# Patient Record
Sex: FEMALE | Race: BLACK OR AFRICAN AMERICAN | NOT HISPANIC OR LATINO | Employment: OTHER | URBAN - METROPOLITAN AREA
[De-identification: names, ages, dates, MRNs, and addresses within clinical notes are randomized per-mention and may not be internally consistent; named-entity substitution may affect disease eponyms.]

---

## 2019-04-03 ENCOUNTER — TELEPHONE (OUTPATIENT)
Dept: NEUROLOGY | Facility: CLINIC | Age: 84
End: 2019-04-03

## 2019-04-30 ENCOUNTER — OFFICE VISIT (OUTPATIENT)
Dept: NEUROLOGY | Facility: CLINIC | Age: 84
End: 2019-04-30
Payer: MEDICARE

## 2019-04-30 VITALS
WEIGHT: 95.88 LBS | DIASTOLIC BLOOD PRESSURE: 64 MMHG | HEIGHT: 62 IN | HEART RATE: 117 BPM | SYSTOLIC BLOOD PRESSURE: 83 MMHG | BODY MASS INDEX: 17.64 KG/M2

## 2019-04-30 DIAGNOSIS — I47.10 SVT (SUPRAVENTRICULAR TACHYCARDIA): ICD-10-CM

## 2019-04-30 DIAGNOSIS — R63.4 WEIGHT LOSS, UNINTENTIONAL: ICD-10-CM

## 2019-04-30 DIAGNOSIS — E78.5 HYPERLIPIDEMIA, UNSPECIFIED HYPERLIPIDEMIA TYPE: ICD-10-CM

## 2019-04-30 DIAGNOSIS — F01.50 VASCULAR DEMENTIA WITHOUT BEHAVIORAL DISTURBANCE: Primary | ICD-10-CM

## 2019-04-30 PROBLEM — R10.32 ABDOMINAL WALL PAIN IN LEFT LOWER QUADRANT: Status: ACTIVE | Noted: 2019-02-03

## 2019-04-30 PROBLEM — R73.03 PREDIABETES: Status: ACTIVE | Noted: 2018-03-03

## 2019-04-30 PROBLEM — F03.90 DEMENTIA: Status: ACTIVE | Noted: 2018-03-03

## 2019-04-30 PROBLEM — I77.1 CELIAC ARTERY STENOSIS: Status: ACTIVE | Noted: 2019-02-03

## 2019-04-30 PROBLEM — J45.909 ASTHMA IN ADULT: Status: ACTIVE | Noted: 2019-04-30

## 2019-04-30 PROCEDURE — 99999 PR PBB SHADOW E&M-EST. PATIENT-LVL III: CPT | Mod: PBBFAC,,, | Performed by: PSYCHIATRY & NEUROLOGY

## 2019-04-30 PROCEDURE — 99204 PR OFFICE/OUTPT VISIT, NEW, LEVL IV, 45-59 MIN: ICD-10-PCS | Mod: S$GLB,,, | Performed by: PSYCHIATRY & NEUROLOGY

## 2019-04-30 PROCEDURE — 1101F PT FALLS ASSESS-DOCD LE1/YR: CPT | Mod: CPTII,S$GLB,, | Performed by: PSYCHIATRY & NEUROLOGY

## 2019-04-30 PROCEDURE — 99999 PR PBB SHADOW E&M-EST. PATIENT-LVL III: ICD-10-PCS | Mod: PBBFAC,,, | Performed by: PSYCHIATRY & NEUROLOGY

## 2019-04-30 PROCEDURE — 1101F PR PT FALLS ASSESS DOC 0-1 FALLS W/OUT INJ PAST YR: ICD-10-PCS | Mod: CPTII,S$GLB,, | Performed by: PSYCHIATRY & NEUROLOGY

## 2019-04-30 PROCEDURE — 99204 OFFICE O/P NEW MOD 45 MIN: CPT | Mod: S$GLB,,, | Performed by: PSYCHIATRY & NEUROLOGY

## 2019-04-30 RX ORDER — ATORVASTATIN CALCIUM 40 MG/1
TABLET, FILM COATED ORAL
Refills: 10 | COMMUNITY
Start: 2019-04-04

## 2019-04-30 RX ORDER — MEMANTINE HYDROCHLORIDE 5 MG/1
5 TABLET ORAL DAILY
Qty: 30 TABLET | Refills: 5 | Status: SHIPPED | OUTPATIENT
Start: 2019-04-30 | End: 2019-11-01 | Stop reason: SDUPTHER

## 2019-04-30 RX ORDER — OMEPRAZOLE 40 MG/1
CAPSULE, DELAYED RELEASE ORAL
Refills: 3 | COMMUNITY
Start: 2019-02-07

## 2019-04-30 RX ORDER — ASPIRIN 81 MG/1
TABLET ORAL
Refills: 11 | COMMUNITY
Start: 2019-02-04

## 2019-04-30 RX ORDER — METOPROLOL TARTRATE 25 MG/1
TABLET, FILM COATED ORAL
Refills: 3 | COMMUNITY
Start: 2019-04-04

## 2019-04-30 NOTE — PATIENT INSTRUCTIONS
Discussed with patient and daughter.  Agree with trying to get her into a day program.  Have given them a letter regarding her diagnosis and limitations.  Follow-up with primary care physician regarding her loss of appetite.  Will initiate Namenda 5 mg daily in the morning.  Advised daughter that her condition will gradually progress to a point that she will need more supervised care in the future.

## 2019-04-30 NOTE — PROGRESS NOTES
Subjective:       Patient ID: Shruthi Viera is a 84 y.o. female.    Chief Complaint:  Memory Loss      History of Present Illness  HPI   This is an 84-year-old female who was referred for evaluation of memory difficulties.  She was accompanied by her daughter reported that she has had intermittent problems with her memory for a few years however this had been much more prominent over the past 1 year.  She has difficulty remembering recent information or conversations they may have had.  The patient now lives with her granddaughter.  The daughter does check on on a regular basis including managing medications.  Her daughter was concerned about her mother's increasing forgetfulness.  The patient does not drive anymore but she does walk to the local store and often forgets her way back. She no longer cooks as she was having problems not turning the stove off.  In addition, she was misplacing things in the house and could not remember where which was making her irritable and frustrated.  The patient had blood work done including TSH, RPR and vitamin B12 that were available for review.  In addition, she had been having weight loss as she was not eating unless told to.  She is presently on Ensure.    Past medical history significant for history of paroxysmal SVT and pulmonary embolus on 03/03/2018.  She has been seen by Cardiology at [facility].  She is presently on metoprolol and low-dose aspirin.  The daughter does report that she is trying to get her into day program.  She has been having sleep difficulties at night and tends to be drowsy in the daytime.       Review of Systems  Review of Systems   Constitutional: Positive for appetite change.   HENT: Negative.  Negative for hearing loss.    Eyes: Negative.  Negative for visual disturbance.   Respiratory: Negative.  Negative for shortness of breath.    Cardiovascular: Negative.  Negative for chest pain and palpitations.   Gastrointestinal: Negative.    Genitourinary:  Negative.    Musculoskeletal: Negative.  Negative for back pain, gait problem and neck pain.   Skin: Negative.    Neurological: Negative.  Negative for tremors, seizures, syncope, speech difficulty, weakness, numbness and headaches.   Psychiatric/Behavioral: Positive for confusion, decreased concentration and sleep disturbance.       Objective:      Neurologic Exam     Cranial Nerves     CN III, IV, VI   Pupils are equal, round, and reactive to light.  Extraocular motions are normal.     Motor Exam     Strength   Strength 5/5 throughout.       Physical Exam   Constitutional:   Low weight somewhat emasciated right-handed female   HENT:   Head: Normocephalic and atraumatic.   Right Ear: Hearing normal.   Left Ear: Hearing normal.   Eyes: Pupils are equal, round, and reactive to light. EOM are normal.   Fundus examination :  Difficult to visualize fundus   Neck: Normal range of motion. Neck supple. Carotid bruit is not present.   Cardiovascular: Normal rate and regular rhythm.   Neurological: She is alert. She has normal strength. She displays abnormal reflex (DTRs generally depressed but symmetrical). She displays no atrophy. No cranial nerve deficit (VF exam somewhat limited.  No facial asymmetry noted with facial movements and sensory exam being normal/symmetrical.  Corneals/gag reflexes normal.  Tongue & palate movements normal.  Shoulder shrug was normal.) or sensory deficit (Primary sensation symmetrical). She exhibits normal muscle tone. Coordination and gait normal. She displays no Babinski's sign on the right side. She displays no Babinski's sign on the left side.   Mental status examination:  patient is alert and verbal.  She is not able to give an adequate recent history.  Thinking is concrete.  She is oriented to place and person but not to time.  Immediate recall is poor.  Affect is appropriate, mood was even.  No thought disorder is noted.   Vitals reviewed.        Assessment:        1. Vascular dementia  without behavioral disturbance    2. Hyperlipidemia, unspecified hyperlipidemia type    3. SVT (supraventricular tachycardia)    4. Weight loss, unintentional            Plan:       Discussed with patient and daughter.  Agree with trying to get her into a day program.  Have given them a letter regarding her diagnosis and limitations.  Follow-up with primary care physician regarding her loss of appetite.  Will initiate Namenda 5 mg daily in the morning.  Advised daughter that her condition will gradually progress to a point that she will need more supervised care in the future.  She will follow up in 6 months.

## 2019-05-14 ENCOUNTER — TELEPHONE (OUTPATIENT)
Dept: NEUROLOGY | Facility: CLINIC | Age: 84
End: 2019-05-14

## 2019-05-14 NOTE — TELEPHONE ENCOUNTER
"----- Message from Tisha Cook sent at 5/13/2019 11:58 AM CDT -----  Contact: Ryan Chris (Daughter) / # [phone]  Name of Who is Calling:  Ryan Chris (Daughter)     What is the request in detail:  Patient's daughter called and said, "she's returning your call and would like you to please call again."    THANKS!      Can the clinic reply by [facility]: No    What Number to Call Back:  Ryan Chris (Daughter) / # [phone]                                    "

## 2019-10-18 ENCOUNTER — TELEPHONE (OUTPATIENT)
Dept: NEUROLOGY | Facility: CLINIC | Age: 84
End: 2019-10-18

## 2019-11-01 ENCOUNTER — OFFICE VISIT (OUTPATIENT)
Dept: NEUROLOGY | Facility: CLINIC | Age: 84
End: 2019-11-01
Payer: MEDICARE

## 2019-11-01 VITALS
HEIGHT: 62 IN | HEART RATE: 72 BPM | SYSTOLIC BLOOD PRESSURE: 123 MMHG | WEIGHT: 102.75 LBS | DIASTOLIC BLOOD PRESSURE: 61 MMHG | BODY MASS INDEX: 18.91 KG/M2

## 2019-11-01 DIAGNOSIS — F03.C0 SEVERE DEMENTIA: Primary | ICD-10-CM

## 2019-11-01 DIAGNOSIS — F01.50 VASCULAR DEMENTIA WITHOUT BEHAVIORAL DISTURBANCE: ICD-10-CM

## 2019-11-01 PROCEDURE — 99215 PR OFFICE/OUTPT VISIT, EST, LEVL V, 40-54 MIN: ICD-10-PCS | Mod: S$GLB,,, | Performed by: PSYCHIATRY & NEUROLOGY

## 2019-11-01 PROCEDURE — 1100F PTFALLS ASSESS-DOCD GE2>/YR: CPT | Mod: CPTII,S$GLB,, | Performed by: PSYCHIATRY & NEUROLOGY

## 2019-11-01 PROCEDURE — 1100F PR PT FALLS ASSESS DOC 2+ FALLS/FALL W/INJURY/YR: ICD-10-PCS | Mod: CPTII,S$GLB,, | Performed by: PSYCHIATRY & NEUROLOGY

## 2019-11-01 PROCEDURE — 3288F PR FALLS RISK ASSESSMENT DOCUMENTED: ICD-10-PCS | Mod: CPTII,S$GLB,, | Performed by: PSYCHIATRY & NEUROLOGY

## 2019-11-01 PROCEDURE — 99215 OFFICE O/P EST HI 40 MIN: CPT | Mod: S$GLB,,, | Performed by: PSYCHIATRY & NEUROLOGY

## 2019-11-01 PROCEDURE — 3288F FALL RISK ASSESSMENT DOCD: CPT | Mod: CPTII,S$GLB,, | Performed by: PSYCHIATRY & NEUROLOGY

## 2019-11-01 PROCEDURE — 99999 PR PBB SHADOW E&M-EST. PATIENT-LVL III: ICD-10-PCS | Mod: PBBFAC,,, | Performed by: PSYCHIATRY & NEUROLOGY

## 2019-11-01 PROCEDURE — 99999 PR PBB SHADOW E&M-EST. PATIENT-LVL III: CPT | Mod: PBBFAC,,, | Performed by: PSYCHIATRY & NEUROLOGY

## 2019-11-01 RX ORDER — MEMANTINE HYDROCHLORIDE 5 MG/1
5 TABLET ORAL DAILY
Qty: 30 TABLET | Refills: 5 | Status: SHIPPED | OUTPATIENT
Start: 2019-11-01 | End: 2020-04-29

## 2019-11-01 NOTE — PROGRESS NOTES
Neurology Consult Note    Chief Complaint: establish care for dementia      HPI:   Shruthi Viera is a 85 y.o. female with medical conditions as outlined below who presents for further evaluation of problems with memory. She was previously seen by Dr. Reeder. She is accompanied to the visit by her daughter who aided in giving history. Daughter states that patient has had slowly progressive problems with memory for over 5 years now. She states patient is able to dress and feed herself, but otherwise needs help with all other ADLs. Daughter states at times, patient talks about things that are not happening. She denies patient becoming agitated or aggressive. She states recently, patient has been wandering outside the house. Patient is currently living with her niece, but is left at home during the day. Her niece leaves food for patient. Patient needs help remembering her medications. She is on namenda 5mg daily, but daughter is unsure if patient has been taking this. Daughter denies patient having a history of stroke or seizures. Daughter states she is going to take her mother to [place] with her to live with her. She has no further complaints.     Past Medical History:  Past Medical History:   Diagnosis Date    Acid reflux     Asthma     Hyperlipidemia     SVT (supraventricular tachycardia)     Weight loss        Past Surgical History:  No past surgical history on file.    Social History:  Social History     Socioeconomic History    Marital status:      Spouse name: Not on file    Number of children: Not on file    Years of education: Not on file    Highest education level: Not on file   Occupational History    Not on file   Social Needs    Financial resource strain: Not on file    Food insecurity:     Worry: Not on file     Inability: Not on file    Transportation needs:     Medical: Not on file     Non-medical: Not on file   Tobacco Use    Smoking status: Never Smoker    Smokeless  tobacco: Never Used   Substance and Sexual Activity    Alcohol use: Yes    Drug use: Never    Sexual activity: Not Currently   Lifestyle    Physical activity:     Days per week: Not on file     Minutes per session: Not on file    Stress: Not on file   Relationships    Social connections:     Talks on phone: Not on file     Gets together: Not on file     Attends Synagogue service: Not on file     Active member of club or organization: Not on file     Attends meetings of clubs or organizations: Not on file     Relationship status: Not on file   Other Topics Concern    Not on file   Social History Narrative    Not on file       Family History:  No family history on file.    Medications:  Current Outpatient Medications   Medication Sig Dispense Refill    aspirin (ECOTRIN) 81 MG EC tablet TK 1 T PO D  11    atorvastatin (LIPITOR) 40 MG tablet TK 1 T PO D  10    memantine (NAMENDA) 5 MG Tab Take 1 tablet (5 mg total) by mouth once daily. 30 tablet 5    metoprolol tartrate (LOPRESSOR) 25 MG tablet TK 1 T PO BID  3    omeprazole (PRILOSEC) 40 MG capsule TK ONE C PO D  3     No current facility-administered medications for this visit.        Allergies:  Review of patient's allergies indicates:  No Known Allergies    ROS:  A 12 point review of system was negative aside from pertinent positives and negatives as outlined above.    Physical Exam  Vitals:    11/01/19 1348   BP: 123/61   Pulse: 72       General: well nourished, well developed  Eyes: no scleral icterus   Nose: nasal turbinates intact  Neck: supple, ROM intact  Skin: no rash or ecchymosis  Joints: no swelling or erythema  Cardiac: regular rate and rhythm  Lungs: clear to auscultation bilaterally    Neuro:  Mental status: awake, alert, oriented to person only, she is aware she is in a doctor's office but not which one, she knows she is in [place], no dysarthria, simple naming intact, fluent speech MMSE 15/30  CN: PERRL, EOMI, VFF, V1-V3 sensation  intact, no facial asymmetry, hearing grossly intact, tongue midline  Motor:   RUE 5/5  RLE 5/5  LUE 5/5  LLE 5/5    Normal bulk and tone    Reflexes: 1+ throughout, toes equivocal bilaterally  Sensory: intact to light touch throughout  Coordination: no dysmetria on FTN  Gait: steady    Prior Imaging/Labs:  No recent neuroimaging available for review  Vitamin b12, tsh and rpr have been checked in the past and were found to be wnl    Assessment and Plan:    85 y.o. female with severe dementia without behavioral disturbance. Daughter is unsure if patient has been taking namenda properly.    1. Severe dementia  - CT Head Without Contrast; Future  - memantine (NAMENDA) 5 MG Tab; Take 1 tablet (5 mg total) by mouth once daily.  Dispense: 30 tablet; Refill: 5  Daughter was made aware that patient should not drive, should not use the kitchen unattended and should be supervised at all times. She demonstrated understanding and states she will be bringing patient to live with her in [place] tomorrow. She states patient will be back in 1-2 months. She was made to call our office at that time, so we can schedule CT head and a follow up appointment    Daughter and patient were made aware of side effects of this medication including rash and were advised to notify me if patient experiences any. Daughter was advised to watch for rash and to seek medical attention immediately if patient develops one          Patient and daughter were advised to notify me for worsening symptoms. I will see patient back in 3 months or sooner if necessary.     Thank you for this consultation.    Shilpa Mckeon DO  [facility]  [address] [facility] [address]  [place] [id number] [phone]